# Patient Record
Sex: MALE | Race: WHITE | HISPANIC OR LATINO
[De-identification: names, ages, dates, MRNs, and addresses within clinical notes are randomized per-mention and may not be internally consistent; named-entity substitution may affect disease eponyms.]

---
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
## 2024-03-19 ENCOUNTER — APPOINTMENT (OUTPATIENT)
Dept: ORTHOPEDIC SURGERY | Facility: CLINIC | Age: 24
End: 2024-03-19
Payer: COMMERCIAL

## 2024-03-19 DIAGNOSIS — Z87.39 PERSONAL HISTORY OF OTHER DISEASES OF THE MUSCULOSKELETAL SYSTEM AND CONNECTIVE TISSUE: ICD-10-CM

## 2024-03-19 DIAGNOSIS — Z78.9 OTHER SPECIFIED HEALTH STATUS: ICD-10-CM

## 2024-03-19 PROCEDURE — 73080 X-RAY EXAM OF ELBOW: CPT | Mod: 50

## 2024-03-19 PROCEDURE — 99203 OFFICE O/P NEW LOW 30 MIN: CPT | Mod: 25

## 2024-03-19 RX ORDER — LAMOTRIGINE 25 MG/1
TABLET ORAL
Refills: 0 | Status: ACTIVE | COMMUNITY

## 2024-03-19 RX ORDER — DOXEPIN 6 MG/1
TABLET, FILM COATED ORAL
Refills: 0 | Status: ACTIVE | COMMUNITY

## 2024-03-19 NOTE — DISCUSSION/SUMMARY
[de-identified] : History, clinical examination and imaging were most consistent with: -bilateral (right>left) elbow medial epicondylitis   The diagnosis was explained in detail. The potential non-surgical and surgical treatments were reviewed. The relative risks and benefits of each option were considered relative to the patients age, activity level, medical history, symptom severity and previously attempted treatments.   The patient was advised to consult with their primary medical provider prior to initiation of any new medications to reduce the risk of adverse effects specific to their long-term home medications and medical history. The risk of gastrointestinal irritation and kidney injury specific to long-term NSAID use was discussed.   -Patient will proceed with formal PT. -Meloxicam and topical voltaren as needed for pain. -Patient to begin use of counterforce strap and removable wrist brace.  -The added clinical utility of an MRI was discussed. The patient deferred further diagnostic testing at this time. -Follow up in 2 months, if symptoms persist consider CSI.    ([facility])   Problem Complexity -Moderate: chronic illness with exacerbation   Risk -Moderate: prescription medication  -Patient has not been seen by another provider in my practice within the past 2 years who specializes in orthopedic surgery.

## 2024-03-19 NOTE — HISTORY OF PRESENT ILLNESS
[de-identified] : Date of initial evaluation is 03/19/2024 Patient age is 23 year  Occupation is [employer]  Body part causing symptoms is the B/L elbows (right>left) Symptoms began NKI 1 year Location of pain is medial Quality of pain is dull Pain score at rest is 5/10  Pain score during activity is 8/10 Radicular symptoms are not present Prior treatments include CSI 1 year ago without relief, Tylenol  Patient's condition is not associated with workers compensation, no-fault or interscholastic athletics

## 2024-03-19 NOTE — IMAGING
[de-identified] : (Exam: Elbow)   Laterality is bilateral   Patient is in no acute distress, alert and oriented Sensation is grossly intact to light touch in the hand Motor function is 5/5 in the hand Capillary refill is less than 2 seconds in the fingers Lymphadenopathy is not present Peripheral edema is not present   Skin is intact Olecranon bursa swelling is not present Biceps deformity is not present Intrinsic atrophy is not present   Lateral epicondyle tenderness is not present Medial epicondyle tenderness is present Radial head tenderness is not present Biceps tenderness is not present Triceps tenderness is not present   Extension is 0 Flexion is 140 Pronation is 80 Supination is 80   Flexion strength is 5/5 Extension strength is 5/5 Pronation strength is 5/5 Supination strength is 5/5   Cozen's test is normal Biceps hook test is normal Tinel's test of ulnar nerve is normal Moving valgus stress test is normal [Bilateral] : elbow bilaterally [There are no fractures, subluxations or dislocations. No significant abnormalities are seen] : There are no fractures, subluxations or dislocations. No significant abnormalities are seen [FreeTextEntry1] : small calcification medial epicondyle on the RT

## 2024-05-21 ENCOUNTER — APPOINTMENT (OUTPATIENT)
Dept: ORTHOPEDIC SURGERY | Facility: CLINIC | Age: 24
End: 2024-05-21

## 2024-06-17 ENCOUNTER — RX RENEWAL (OUTPATIENT)
Age: 24
End: 2024-06-17

## 2024-06-17 RX ORDER — MELOXICAM 15 MG/1
15 TABLET ORAL DAILY
Qty: 30 | Refills: 2 | Status: ACTIVE | COMMUNITY
Start: 2024-03-19 | End: 1900-01-01

## 2024-06-18 ENCOUNTER — APPOINTMENT (OUTPATIENT)
Dept: ORTHOPEDIC SURGERY | Facility: CLINIC | Age: 24
End: 2024-06-18

## 2024-06-18 DIAGNOSIS — M77.02 MEDIAL EPICONDYLITIS, LEFT ELBOW: ICD-10-CM

## 2024-06-18 DIAGNOSIS — M77.01 MEDIAL EPICONDYLITIS, RIGHT ELBOW: ICD-10-CM

## 2024-06-18 PROCEDURE — 20605 DRAIN/INJ JOINT/BURSA W/O US: CPT | Mod: 50

## 2024-06-18 PROCEDURE — 99214 OFFICE O/P EST MOD 30 MIN: CPT | Mod: 25

## 2024-06-18 NOTE — HISTORY OF PRESENT ILLNESS
[de-identified] : 06/17/2024: f/u for B/L elbows. using braces. doing HEP 4x a week. Symptoms remain the same as visit. voltaren PRN.  Date of initial evaluation is 03/19/2024 Patient age is 23 year  Occupation is [employer]  Body part causing symptoms is the B/L elbows (right>left) Symptoms began NKI 1 year Location of pain is medial Quality of pain is dull Pain score at rest is 5/10  Pain score during activity is 8/10 Radicular symptoms are not present Prior treatments include CSI 1 year ago without relief, Tylenol  Patient's condition is not associated with workers compensation, no-fault or interscholastic athletics

## 2024-06-18 NOTE — IMAGING
[Bilateral] : elbow bilaterally [There are no fractures, subluxations or dislocations. No significant abnormalities are seen] : There are no fractures, subluxations or dislocations. No significant abnormalities are seen [de-identified] : (Exam: Elbow)   Laterality is bilateral   Patient is in no acute distress, alert and oriented Sensation is grossly intact to light touch in the hand Motor function is 5/5 in the hand Capillary refill is less than 2 seconds in the fingers Lymphadenopathy is not present Peripheral edema is not present   Skin is intact Olecranon bursa swelling is not present Biceps deformity is not present Intrinsic atrophy is not present   Lateral epicondyle tenderness is not present Medial epicondyle tenderness is present Radial head tenderness is not present Biceps tenderness is not present Triceps tenderness is not present   Extension is 0 Flexion is 140 Pronation is 80 Supination is 80   Flexion strength is 5/5 Extension strength is 5/5 Pronation strength is 5/5 Supination strength is 5/5   Cozen's test is normal Biceps hook test is normal Tinel's test of ulnar nerve is normal Moving valgus stress test is normal [FreeTextEntry1] : small calcification medial epicondyle on the RT

## 2024-06-18 NOTE — DISCUSSION/SUMMARY
[de-identified] : History, clinical examination and imaging were most consistent with: -bilateral (right>left) elbow medial epicondylitis   The diagnosis was explained in detail. The potential non-surgical and surgical treatments were reviewed. The relative risks and benefits of each option were considered relative to the patients age, activity level, medical history, symptom severity and previously attempted treatments.   The patient was advised to consult with their primary medical provider prior to initiation of any new medications to reduce the risk of adverse effects specific to their long-term home medications and medical history. The risk of gastrointestinal irritation and kidney injury specific to long-term NSAID use was discussed.   -Recommend to begin formal PT. -Cortisone injection performed for symptom relief. -Meloxicam and topical voltaren as needed for pain. -Continue counterforce strap and removable wrist brace.  -Follow up in 2 months, if symptoms persist consider MRI.    ([facility])   Problem Complexity -Moderate: chronic illness with exacerbation   Risk -Moderate: injection  (Procedure: Corticosteroid Injection)   Injection location was the: -bilateral elbow medial epicondyle   Injection contents were: 40 mg of kenalog and 2 mL of 1% lidocaine   Procedure Details: -Informed consent was obtained. Discussed possible risks of corticosteroid injection including hyperglycemia, infection and skin discoloration. -Discussed that due to infection risk, an interval between injection and any future surgery is 6 weeks for arthroscopy and 3 months for arthroplasty. -Injection performed using aseptic technique. Hemostasis was confirmed. -Procedure was tolerated well with no complications.

## 2024-06-23 ENCOUNTER — RX RENEWAL (OUTPATIENT)
Age: 24
End: 2024-06-23

## 2024-06-23 RX ORDER — DICLOFENAC SODIUM 1% 10 MG/G
1 GEL TOPICAL
Qty: 100 | Refills: 2 | Status: ACTIVE | COMMUNITY
Start: 2024-03-19 | End: 1900-01-01

## 2024-08-20 ENCOUNTER — APPOINTMENT (OUTPATIENT)
Dept: ORTHOPEDIC SURGERY | Facility: CLINIC | Age: 24
End: 2024-08-20

## 2024-09-23 ENCOUNTER — RX RENEWAL (OUTPATIENT)
Age: 24
End: 2024-09-23